# Patient Record
Sex: MALE | Race: WHITE | URBAN - METROPOLITAN AREA
[De-identification: names, ages, dates, MRNs, and addresses within clinical notes are randomized per-mention and may not be internally consistent; named-entity substitution may affect disease eponyms.]

---

## 2024-03-14 ENCOUNTER — OV NP (OUTPATIENT)
Dept: URBAN - METROPOLITAN AREA CLINIC 50 | Facility: CLINIC | Age: 64
End: 2024-03-14

## 2024-03-20 ENCOUNTER — LAB (OUTPATIENT)
Dept: URBAN - METROPOLITAN AREA CLINIC 50 | Facility: CLINIC | Age: 64
End: 2024-03-20

## 2024-03-20 ENCOUNTER — OV NP (OUTPATIENT)
Dept: URBAN - METROPOLITAN AREA CLINIC 50 | Facility: CLINIC | Age: 64
End: 2024-03-20
Payer: SELF-PAY

## 2024-03-20 VITALS
SYSTOLIC BLOOD PRESSURE: 143 MMHG | BODY MASS INDEX: 24.48 KG/M2 | DIASTOLIC BLOOD PRESSURE: 90 MMHG | WEIGHT: 171 LBS | HEART RATE: 75 BPM | HEIGHT: 70 IN | TEMPERATURE: 97.9 F

## 2024-03-20 DIAGNOSIS — D36.9 TUBULAR ADENOMA: ICD-10-CM

## 2024-03-20 DIAGNOSIS — K62.5 BRBPR (BRIGHT RED BLOOD PER RECTUM): ICD-10-CM

## 2024-03-20 DIAGNOSIS — K64.4 HEMORRHOIDS, EXTERNAL: ICD-10-CM

## 2024-03-20 PROCEDURE — 99244 OFF/OP CNSLTJ NEW/EST MOD 40: CPT | Performed by: PHYSICIAN ASSISTANT

## 2024-03-20 RX ORDER — SODIUM, POTASSIUM,MAG SULFATES 17.5-3.13G
177ML SOLUTION, RECONSTITUTED, ORAL ORAL
Qty: 1 | Refills: 0 | OUTPATIENT
Start: 2024-03-20 | End: 2024-03-21

## 2024-03-20 NOTE — PHYSICAL EXAM GASTROINTESTINAL
Abdomen , soft, nontender, nondistended , no guarding or rigidity , no masses palpable , normal bowel sounds , Liver and Spleen,  no hepatosplenomegaly , liver nontender, Rectal, normal sphincter tone, no internal hemorrhoids, rectal masses or bleeding present, large non-tender non-bleeding external hemorrhoid noted

## 2024-03-20 NOTE — HPI-TODAY'S VISIT:
Patient of Dr. Kaitlin Galvez is 63 y/o M here for dicsussion on colonoscopy and rectal bleeding Notes hemorrhoisd on and off for years, but usually doesn't bother States had seen pelvic floor therapy for bladder issues in past, noted to have hemorrhoids at that time Occasional w sitting, travel gets rare blood w wiping, once a at time w triggers, nothing ever persisted About 2 months ago, had several weeks where sitting more than typical and hemorrhoids flared Getting more and more blood now w bowel movements in past 10 days, irritation/itching/discomfort Trying chiropractic techiniques to help, has concomittant back stiffness/discomfort BMs presently once daily, more gas though lately Trying to increase fiber, using metamucil Now using anucort, proctocort, prep H to help, but still w problems Today, hemorrhoid feels less irritated, but still w blood in stool Also note is due for 5 yr surviellance colonoscopy, last 4/19, history tubular adenoma No abnormal weight loss, appetite gooddenies hx anemia Most recent CBC 3/14/24 w H/H 16.6./49.4 Planning on going out of country in 10 days and worried will continue to have symptoms Has already considered futher making a follow up w proctologist as well but unsure

## 2024-09-12 ENCOUNTER — WEB ENCOUNTER (OUTPATIENT)
Dept: URBAN - METROPOLITAN AREA CLINIC 50 | Facility: CLINIC | Age: 64
End: 2024-09-12

## 2024-11-06 ENCOUNTER — OFFICE VISIT (OUTPATIENT)
Dept: URBAN - METROPOLITAN AREA SURGERY CENTER 18 | Facility: SURGERY CENTER | Age: 64
End: 2024-11-06

## 2024-11-19 ENCOUNTER — WEB ENCOUNTER (OUTPATIENT)
Dept: URBAN - METROPOLITAN AREA CLINIC 50 | Facility: CLINIC | Age: 64
End: 2024-11-19

## 2024-11-27 ENCOUNTER — OFFICE VISIT (OUTPATIENT)
Dept: URBAN - METROPOLITAN AREA SURGERY CENTER 18 | Facility: SURGERY CENTER | Age: 64
End: 2024-11-27

## 2024-12-19 ENCOUNTER — TELEPHONE ENCOUNTER (OUTPATIENT)
Dept: URBAN - METROPOLITAN AREA CLINIC 50 | Facility: CLINIC | Age: 64
End: 2024-12-19

## 2024-12-20 ENCOUNTER — WEB ENCOUNTER (OUTPATIENT)
Dept: URBAN - METROPOLITAN AREA CLINIC 50 | Facility: CLINIC | Age: 64
End: 2024-12-20

## 2024-12-20 ENCOUNTER — OFFICE VISIT (OUTPATIENT)
Dept: URBAN - METROPOLITAN AREA CLINIC 50 | Facility: CLINIC | Age: 64
End: 2024-12-20
Payer: SELF-PAY

## 2024-12-20 ENCOUNTER — LAB OUTSIDE AN ENCOUNTER (OUTPATIENT)
Dept: URBAN - METROPOLITAN AREA CLINIC 50 | Facility: CLINIC | Age: 64
End: 2024-12-20

## 2024-12-20 VITALS
HEART RATE: 73 BPM | HEIGHT: 70 IN | TEMPERATURE: 98.6 F | WEIGHT: 173.2 LBS | BODY MASS INDEX: 24.79 KG/M2 | DIASTOLIC BLOOD PRESSURE: 95 MMHG | SYSTOLIC BLOOD PRESSURE: 136 MMHG

## 2024-12-20 DIAGNOSIS — Z80.0 FAMILY HX OF COLON CANCER: ICD-10-CM

## 2024-12-20 DIAGNOSIS — K64.4 HEMORRHOIDS, EXTERNAL: ICD-10-CM

## 2024-12-20 DIAGNOSIS — R14.0 ABDOMINAL BLOATING: ICD-10-CM

## 2024-12-20 DIAGNOSIS — Z12.11 SCREENING FOR COLON CANCER: ICD-10-CM

## 2024-12-20 DIAGNOSIS — D36.9 TUBULAR ADENOMA: ICD-10-CM

## 2024-12-20 PROCEDURE — 99214 OFFICE O/P EST MOD 30 MIN: CPT | Performed by: PHYSICIAN ASSISTANT

## 2024-12-20 RX ORDER — DICYCLOMINE HYDROCHLORIDE 10 MG/1
1 CAPSULES CAPSULE ORAL TWICE DAILY
Qty: 30 | Refills: 0 | OUTPATIENT
Start: 2024-12-20 | End: 2025-01-04

## 2024-12-20 NOTE — HPI-TODAY'S VISIT:
12/20/24: 65 y/o M here discussion on bloating Worried having much more gas/bloating/discomfort, worse in afternoon Worried as eating pretty healthy Talked to a homeopath friend who suggested may had kinza overgrowth Started on supplement/probiotic thing, not sure if quite good yet Also gets an occ rash along chest wall, uses a steroid cream which clears it up Wonders if needs an antifungal to fix the bloating No nausea/vomiting, no over pains BMs better w less constipation lately, BMs 1-3x/day, no blood/melena, more predictable lately Appetite good, no wt loss Last labs in March as last discussed Wonders if needs a "microbiome" test Ended up seeing hemorrhoid center for hemorrhoid issue after last visit - did some banding, seemed to help syx Now getting better vs earlier in year, today things are really good, no blood/irritation/itching Still occ discomfort sitting but otherwise mostly better Also worried on billing issues w colonoscopy Was told per insurance needs a code change so could be covered No indigestion/heartburn/dysphagia/reflux -- 3/20/24: Patient of Dr. Kaitlin Galvez is 65 y/o M here for dicsussion on colonoscopy and rectal bleeding Notes hemorrhoisd on and off for years, but usually doesn't bother States had seen pelvic floor therapy for bladder issues in past, noted to have hemorrhoids at that time Occasional w sitting, travel gets rare blood w wiping, once a at time w triggers, nothing ever persisted About 2 months ago, had several weeks where sitting more than typical and hemorrhoids flared Getting more and more blood now w bowel movements in past 10 days, irritation/itching/discomfort Trying chiropractic techiniques to help, has concomittant back stiffness/discomfort BMs presently once daily, more gas though lately Trying to increase fiber, using metamucil Now using anucort, proctocort, prep H to help, but still w problems Today, hemorrhoid feels less irritated, but still w blood in stool Also note is due for 5 yr surviellance colonoscopy, last 4/19, history tubular adenoma No abnormal weight loss, appetite gooddenies hx anemia Most recent CBC 3/14/24 w H/H 16.6./49.4 Planning on going out of country in 10 days and worried will continue to have symptoms Has already considered futher making a follow up w proctologist as well but unsure

## 2024-12-23 ENCOUNTER — DASHBOARD ENCOUNTERS (OUTPATIENT)
Age: 64
End: 2024-12-23

## 2024-12-23 ENCOUNTER — WEB ENCOUNTER (OUTPATIENT)
Dept: URBAN - METROPOLITAN AREA CLINIC 50 | Facility: CLINIC | Age: 64
End: 2024-12-23

## 2025-01-14 ENCOUNTER — WEB ENCOUNTER (OUTPATIENT)
Dept: URBAN - METROPOLITAN AREA CLINIC 50 | Facility: CLINIC | Age: 65
End: 2025-01-14

## 2025-01-14 ENCOUNTER — LAB OUTSIDE AN ENCOUNTER (OUTPATIENT)
Dept: URBAN - METROPOLITAN AREA CLINIC 50 | Facility: CLINIC | Age: 65
End: 2025-01-14

## 2025-01-17 ENCOUNTER — TELEPHONE ENCOUNTER (OUTPATIENT)
Dept: URBAN - METROPOLITAN AREA CLINIC 50 | Facility: CLINIC | Age: 65
End: 2025-01-17

## 2025-01-20 ENCOUNTER — WEB ENCOUNTER (OUTPATIENT)
Dept: URBAN - METROPOLITAN AREA CLINIC 50 | Facility: CLINIC | Age: 65
End: 2025-01-20

## 2025-01-20 RX ORDER — RIFAXIMIN 550 MG/1
1 TABLET TABLET ORAL THREE TIMES A DAY
Qty: 42 TABLET | Refills: 0 | OUTPATIENT
Start: 2025-01-21 | End: 2025-02-04

## 2025-01-21 ENCOUNTER — WEB ENCOUNTER (OUTPATIENT)
Dept: URBAN - METROPOLITAN AREA CLINIC 50 | Facility: CLINIC | Age: 65
End: 2025-01-21

## 2025-01-21 PROBLEM — 197125005: Status: ACTIVE | Noted: 2025-01-21

## 2025-01-23 ENCOUNTER — WEB ENCOUNTER (OUTPATIENT)
Dept: URBAN - METROPOLITAN AREA CLINIC 50 | Facility: CLINIC | Age: 65
End: 2025-01-23

## 2025-01-23 ENCOUNTER — LAB OUTSIDE AN ENCOUNTER (OUTPATIENT)
Dept: URBAN - METROPOLITAN AREA CLINIC 96 | Facility: CLINIC | Age: 65
End: 2025-01-23

## 2025-01-23 LAB
CREATININE POC: 1.1
PERFORMING LAB: (no result)

## 2025-01-28 ENCOUNTER — TELEPHONE ENCOUNTER (OUTPATIENT)
Dept: URBAN - METROPOLITAN AREA CLINIC 50 | Facility: CLINIC | Age: 65
End: 2025-01-28

## 2025-02-04 ENCOUNTER — OFFICE VISIT (OUTPATIENT)
Dept: URBAN - METROPOLITAN AREA SURGERY CENTER 18 | Facility: SURGERY CENTER | Age: 65
End: 2025-02-04
Payer: COMMERCIAL

## 2025-02-04 ENCOUNTER — CLAIMS CREATED FROM THE CLAIM WINDOW (OUTPATIENT)
Dept: URBAN - METROPOLITAN AREA CLINIC 4 | Facility: CLINIC | Age: 65
End: 2025-02-04
Payer: COMMERCIAL

## 2025-02-04 DIAGNOSIS — Z12.11 COLON CANCER SCREENING (HIGH RISK): ICD-10-CM

## 2025-02-04 DIAGNOSIS — D12.2 ADENOMA OF ASCENDING COLON: ICD-10-CM

## 2025-02-04 DIAGNOSIS — Z86.0100 HISTORY OF COLON POLYPS: ICD-10-CM

## 2025-02-04 DIAGNOSIS — D12.2 BENIGN NEOPLASM OF ASCENDING COLON: ICD-10-CM

## 2025-02-04 DIAGNOSIS — D12.2 ADENOMATOUS POLYP OF ASCENDING COLON: ICD-10-CM

## 2025-02-04 DIAGNOSIS — K62.6 ULCER OF ANUS AND RECTUM: ICD-10-CM

## 2025-02-04 PROCEDURE — 00812 ANES LWR INTST SCR COLSC: CPT | Performed by: NURSE ANESTHETIST, CERTIFIED REGISTERED

## 2025-02-04 PROCEDURE — 88305 TISSUE EXAM BY PATHOLOGIST: CPT | Performed by: PATHOLOGY

## 2025-02-04 PROCEDURE — 45380 COLONOSCOPY AND BIOPSY: CPT | Performed by: INTERNAL MEDICINE

## 2025-02-04 RX ORDER — RIFAXIMIN 550 MG/1
1 TABLET TABLET ORAL THREE TIMES A DAY
Qty: 42 TABLET | Refills: 0 | Status: ACTIVE | COMMUNITY
Start: 2025-01-21 | End: 2025-02-04

## 2025-02-11 ENCOUNTER — OFFICE VISIT (OUTPATIENT)
Dept: URBAN - METROPOLITAN AREA SURGERY CENTER 18 | Facility: SURGERY CENTER | Age: 65
End: 2025-02-11